# Patient Record
Sex: MALE | Race: WHITE
[De-identification: names, ages, dates, MRNs, and addresses within clinical notes are randomized per-mention and may not be internally consistent; named-entity substitution may affect disease eponyms.]

---
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
## 2018-12-17 ENCOUNTER — HOSPITAL ENCOUNTER (EMERGENCY)
Dept: HOSPITAL 47 - EC | Age: 18
Discharge: HOME | End: 2018-12-17
Payer: COMMERCIAL

## 2018-12-17 VITALS — TEMPERATURE: 98 F

## 2018-12-17 VITALS — DIASTOLIC BLOOD PRESSURE: 65 MMHG | HEART RATE: 73 BPM | SYSTOLIC BLOOD PRESSURE: 142 MMHG | RESPIRATION RATE: 17 BRPM

## 2018-12-17 DIAGNOSIS — H16.133: Primary | ICD-10-CM
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
## 2018-12-17 PROCEDURE — 99283 EMERGENCY DEPT VISIT LOW MDM: CPT

## 2018-12-17 NOTE — ED
Eye Problem HPI





- General


Chief complaint: Eye Problems


Stated complaint: Eye Injury


Time Seen by Provider: 12/17/18 04:48


Source: patient, family


Mode of arrival: ambulatory


Limitations: no limitations





- History of Present Illness


Initial comments: 





Patient's 18-year-old man here to be evaluated for bilateral eye redness and 

pain.  Came on tonight around 1 AM.  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  Patient denies any real 

change in visual acuity.  She states that there is some blurriness but believes 

is because his eyes are tearing.  No previous ophthalmologic history.  Patient 

denies any foreign body.  He was not doing any high-speed  grinding. I will 

questioned, patient was not sure when his last tetanus shot is but certainly 

was less than 10 years ago


MD chief complaint: eye pain, eye redness


-: hour(s)


Onset Description: gradual


Location: both eyes


Place: home


If Injury: UV exposure


Eye Symptoms: burning, redness


Severity: severe


If Pain, Quality: burning


Consistency: constant


Associated Symptoms: none


Treatments Prior to Arrival: none





- Related Data


Patient Tetanus UTD: Yes (< 10 yrs)


 Home Medications











 Medication  Instructions  Recorded  Confirmed


 


No Known Home Medications  11/15/17 11/15/17











 Allergies











Allergy/AdvReac Type Severity Reaction Status Date / Time


 


No Known Allergies Allergy   Verified 11/15/17 17:37














Review of Systems


ROS Statement: 


Those systems with pertinent positive or pertinent negative responses have been 

documented in the HPI.





ROS Other: All systems not noted in ROS Statement are negative.


Constitutional: Denies: fever, chills


Eyes: Reports: as per HPI, eye pain, other (Tearing)


Respiratory: Denies: cough, dyspnea


Gastrointestinal: Denies: nausea, vomiting


Skin: Denies: rash


Neurological: Denies: headache





Past Medical History


Past Medical History: Hypertension


History of Any Multi-Drug Resistant Organisms: None Reported


Additional Past Surgical History / Comment(s): lymph nodes removed


Past Psychological History: No Psychological Hx Reported


Smoking Status: Never smoker


Past Alcohol Use History: None Reported


Past Drug Use History: None Reported





General Exam


Limitations: no limitations


General appearance: alert, in no apparent distress


Head exam: Present: atraumatic, normocephalic


Eye exam: Present: PERRL, EOMI, conjunctival injection.  Absent: scleral icterus

, nystagmus


  ** Expanded


Eyelids: Normal Inspection: Bilateral


Pupils: Regular, Round: Bilateral, Reactive: Bilateral


Sclera/Conjunctival: Injection: Bilateral


Anterior chamber: Normal Inspection: Bilateral





Course


 Vital Signs











  12/17/18





  04:32


 


Temperature 98.0 F


 


Pulse Rate 74


 


Respiratory 16





Rate 


 


Blood Pressure 159/89


 


O2 Sat by Pulse 99





Oximetry 














Medical Decision Making





- Medical Decision Making





Floor seems stain applied and there is bilateral superficial punctate keratitis.





Patient did have relief of symptoms with the proparacaine.





Discussed appropriate further care and follow-up as well as return parameters.





Disposition


Clinical Impression: 


 Welders' keratitis





Disposition: HOME SELF-CARE


Condition: Good


Instructions:  Corneal [place] (ED)


Is patient prescribed a controlled substance at d/c from ED?: No


Referrals: 


Raman Ramirez DO [Primary Care Provider] - 1-2 days


Karyna Chio MD [STAFF PHYSICIAN] - 1-2 days